# Patient Record
Sex: FEMALE | Race: BLACK OR AFRICAN AMERICAN | NOT HISPANIC OR LATINO | Employment: FULL TIME | URBAN - METROPOLITAN AREA
[De-identification: names, ages, dates, MRNs, and addresses within clinical notes are randomized per-mention and may not be internally consistent; named-entity substitution may affect disease eponyms.]

---

## 2022-08-03 ENCOUNTER — HOSPITAL ENCOUNTER (EMERGENCY)
Facility: HOSPITAL | Age: 52
Discharge: HOME | End: 2022-08-03
Attending: EMERGENCY MEDICINE

## 2022-08-03 VITALS
SYSTOLIC BLOOD PRESSURE: 195 MMHG | RESPIRATION RATE: 16 BRPM | HEIGHT: 63 IN | DIASTOLIC BLOOD PRESSURE: 88 MMHG | TEMPERATURE: 98 F | OXYGEN SATURATION: 98 % | WEIGHT: 160 LBS | HEART RATE: 58 BPM | BODY MASS INDEX: 28.35 KG/M2

## 2022-08-03 DIAGNOSIS — R10.11 RUQ PAIN: ICD-10-CM

## 2022-08-03 DIAGNOSIS — K80.50 BILIARY COLIC: Primary | ICD-10-CM

## 2022-08-03 DIAGNOSIS — R07.81 RIB PAIN ON RIGHT SIDE: ICD-10-CM

## 2022-08-03 DIAGNOSIS — R10.13 EPIGASTRIC ABDOMINAL PAIN: ICD-10-CM

## 2022-08-03 DIAGNOSIS — K80.20 CALCULUS OF GALLBLADDER WITHOUT CHOLECYSTITIS WITHOUT OBSTRUCTION: ICD-10-CM

## 2022-08-03 PROBLEM — M47.9 DEGENERATION OF SPINE: Status: ACTIVE | Noted: 2022-08-03

## 2022-08-03 PROBLEM — R10.10 PAIN OF UPPER ABDOMEN: Status: ACTIVE | Noted: 2022-08-03

## 2022-08-03 LAB
ALBUMIN SERPL BCP-MCNC: 4.4 G/DL (ref 3.5–5.2)
ALP SERPL-CCNC: 64 U/L (ref 55–135)
ALT SERPL W/O P-5'-P-CCNC: 11 U/L (ref 10–44)
ANION GAP SERPL CALC-SCNC: 12 MMOL/L (ref 8–16)
AST SERPL-CCNC: 16 U/L (ref 10–40)
BASOPHILS # BLD AUTO: 0.02 K/UL (ref 0–0.2)
BASOPHILS NFR BLD: 0.2 % (ref 0–1.9)
BILIRUB SERPL-MCNC: 0.6 MG/DL (ref 0.1–1)
BILIRUB UR QL STRIP: NEGATIVE
BUN SERPL-MCNC: 7 MG/DL (ref 6–20)
CALCIUM SERPL-MCNC: 10.1 MG/DL (ref 8.7–10.5)
CHLORIDE SERPL-SCNC: 104 MMOL/L (ref 95–110)
CLARITY UR REFRACT.AUTO: CLEAR
CO2 SERPL-SCNC: 22 MMOL/L (ref 23–29)
COLOR UR AUTO: YELLOW
CREAT SERPL-MCNC: 0.7 MG/DL (ref 0.5–1.4)
DIFFERENTIAL METHOD: ABNORMAL
EOSINOPHIL # BLD AUTO: 0 K/UL (ref 0–0.5)
EOSINOPHIL NFR BLD: 0.1 % (ref 0–8)
ERYTHROCYTE [DISTWIDTH] IN BLOOD BY AUTOMATED COUNT: 14.9 % (ref 11.5–14.5)
EST. GFR  (NO RACE VARIABLE): >60 ML/MIN/1.73 M^2
GLUCOSE SERPL-MCNC: 84 MG/DL (ref 70–110)
GLUCOSE UR QL STRIP: NEGATIVE
HCT VFR BLD AUTO: 37.4 % (ref 37–48.5)
HGB BLD-MCNC: 13.2 G/DL (ref 12–16)
HGB UR QL STRIP: ABNORMAL
IMM GRANULOCYTES # BLD AUTO: 0.02 K/UL (ref 0–0.04)
IMM GRANULOCYTES NFR BLD AUTO: 0.2 % (ref 0–0.5)
KETONES UR QL STRIP: ABNORMAL
LEUKOCYTE ESTERASE UR QL STRIP: NEGATIVE
LIPASE SERPL-CCNC: 57 U/L (ref 4–60)
LYMPHOCYTES # BLD AUTO: 1.5 K/UL (ref 1–4.8)
LYMPHOCYTES NFR BLD: 15.2 % (ref 18–48)
MCH RBC QN AUTO: 33.8 PG (ref 27–31)
MCHC RBC AUTO-ENTMCNC: 35.3 G/DL (ref 32–36)
MCV RBC AUTO: 96 FL (ref 82–98)
MICROSCOPIC COMMENT: NORMAL
MONOCYTES # BLD AUTO: 0.5 K/UL (ref 0.3–1)
MONOCYTES NFR BLD: 5.3 % (ref 4–15)
NEUTROPHILS # BLD AUTO: 7.6 K/UL (ref 1.8–7.7)
NEUTROPHILS NFR BLD: 79 % (ref 38–73)
NITRITE UR QL STRIP: NEGATIVE
NRBC BLD-RTO: 0 /100 WBC
PH UR STRIP: 5 [PH] (ref 5–8)
PLATELET # BLD AUTO: 273 K/UL (ref 150–450)
PMV BLD AUTO: 9.9 FL (ref 9.2–12.9)
POTASSIUM SERPL-SCNC: 3.6 MMOL/L (ref 3.5–5.1)
PROT SERPL-MCNC: 8 G/DL (ref 6–8.4)
PROT UR QL STRIP: NEGATIVE
RBC # BLD AUTO: 3.91 M/UL (ref 4–5.4)
RBC #/AREA URNS AUTO: 2 /HPF (ref 0–4)
SARS-COV-2 RDRP RESP QL NAA+PROBE: NEGATIVE
SODIUM SERPL-SCNC: 138 MMOL/L (ref 136–145)
SP GR UR STRIP: 1.01 (ref 1–1.03)
SQUAMOUS #/AREA URNS AUTO: 1 /HPF
TROPONIN I SERPL DL<=0.01 NG/ML-MCNC: 0.01 NG/ML (ref 0–0.03)
URN SPEC COLLECT METH UR: ABNORMAL
WBC # BLD AUTO: 9.65 K/UL (ref 3.9–12.7)
WBC #/AREA URNS AUTO: 1 /HPF (ref 0–5)

## 2022-08-03 PROCEDURE — 99285 EMERGENCY DEPT VISIT HI MDM: CPT | Mod: CS,,, | Performed by: PHYSICIAN ASSISTANT

## 2022-08-03 PROCEDURE — 96375 TX/PRO/DX INJ NEW DRUG ADDON: CPT

## 2022-08-03 PROCEDURE — 81001 URINALYSIS AUTO W/SCOPE: CPT | Performed by: PHYSICIAN ASSISTANT

## 2022-08-03 PROCEDURE — 93010 ELECTROCARDIOGRAM REPORT: CPT | Mod: ,,, | Performed by: INTERNAL MEDICINE

## 2022-08-03 PROCEDURE — U0002 COVID-19 LAB TEST NON-CDC: HCPCS | Performed by: PHYSICIAN ASSISTANT

## 2022-08-03 PROCEDURE — 63600175 PHARM REV CODE 636 W HCPCS: Performed by: PHYSICIAN ASSISTANT

## 2022-08-03 PROCEDURE — 96374 THER/PROPH/DIAG INJ IV PUSH: CPT

## 2022-08-03 PROCEDURE — 80053 COMPREHEN METABOLIC PANEL: CPT | Performed by: PHYSICIAN ASSISTANT

## 2022-08-03 PROCEDURE — 25000003 PHARM REV CODE 250: Performed by: PHYSICIAN ASSISTANT

## 2022-08-03 PROCEDURE — 99285 EMERGENCY DEPT VISIT HI MDM: CPT | Mod: 25

## 2022-08-03 PROCEDURE — 84484 ASSAY OF TROPONIN QUANT: CPT | Performed by: PHYSICIAN ASSISTANT

## 2022-08-03 PROCEDURE — 87389 HIV-1 AG W/HIV-1&-2 AB AG IA: CPT | Performed by: PHYSICIAN ASSISTANT

## 2022-08-03 PROCEDURE — 99285 PR EMERGENCY DEPT VISIT,LEVEL V: ICD-10-PCS | Mod: CS,,, | Performed by: PHYSICIAN ASSISTANT

## 2022-08-03 PROCEDURE — 85025 COMPLETE CBC W/AUTO DIFF WBC: CPT | Performed by: PHYSICIAN ASSISTANT

## 2022-08-03 PROCEDURE — 96361 HYDRATE IV INFUSION ADD-ON: CPT

## 2022-08-03 PROCEDURE — 86803 HEPATITIS C AB TEST: CPT | Performed by: PHYSICIAN ASSISTANT

## 2022-08-03 PROCEDURE — 83690 ASSAY OF LIPASE: CPT | Performed by: PHYSICIAN ASSISTANT

## 2022-08-03 PROCEDURE — 93010 EKG 12-LEAD: ICD-10-PCS | Mod: ,,, | Performed by: INTERNAL MEDICINE

## 2022-08-03 PROCEDURE — 93005 ELECTROCARDIOGRAM TRACING: CPT

## 2022-08-03 RX ORDER — HYDROMORPHONE HYDROCHLORIDE 1 MG/ML
0.5 INJECTION, SOLUTION INTRAMUSCULAR; INTRAVENOUS; SUBCUTANEOUS
Status: COMPLETED | OUTPATIENT
Start: 2022-08-03 | End: 2022-08-03

## 2022-08-03 RX ORDER — MORPHINE SULFATE 2 MG/ML
6 INJECTION, SOLUTION INTRAMUSCULAR; INTRAVENOUS
Status: DISCONTINUED | OUTPATIENT
Start: 2022-08-03 | End: 2022-08-03

## 2022-08-03 RX ORDER — IBUPROFEN 800 MG/1
800 TABLET ORAL EVERY 6 HOURS PRN
Qty: 20 TABLET | Refills: 0 | Status: SHIPPED | OUTPATIENT
Start: 2022-08-03

## 2022-08-03 RX ORDER — ACETAMINOPHEN 325 MG/1
650 TABLET ORAL
Status: COMPLETED | OUTPATIENT
Start: 2022-08-03 | End: 2022-08-03

## 2022-08-03 RX ORDER — ONDANSETRON 2 MG/ML
4 INJECTION INTRAMUSCULAR; INTRAVENOUS
Status: COMPLETED | OUTPATIENT
Start: 2022-08-03 | End: 2022-08-03

## 2022-08-03 RX ORDER — MORPHINE SULFATE 4 MG/ML
4 INJECTION, SOLUTION INTRAMUSCULAR; INTRAVENOUS
Status: COMPLETED | OUTPATIENT
Start: 2022-08-03 | End: 2022-08-03

## 2022-08-03 RX ORDER — HYDROCODONE BITARTRATE AND ACETAMINOPHEN 5; 325 MG/1; MG/1
1 TABLET ORAL EVERY 6 HOURS PRN
Qty: 12 TABLET | Refills: 0 | Status: SHIPPED | OUTPATIENT
Start: 2022-08-03

## 2022-08-03 RX ORDER — IBUPROFEN 400 MG/1
800 TABLET ORAL EVERY 4 HOURS
COMMUNITY
End: 2022-08-03

## 2022-08-03 RX ADMIN — ONDANSETRON 4 MG: 2 INJECTION INTRAMUSCULAR; INTRAVENOUS at 05:08

## 2022-08-03 RX ADMIN — HYDROMORPHONE HYDROCHLORIDE 0.5 MG: 1 INJECTION, SOLUTION INTRAMUSCULAR; INTRAVENOUS; SUBCUTANEOUS at 07:08

## 2022-08-03 RX ADMIN — MORPHINE SULFATE 4 MG: 4 INJECTION INTRAVENOUS at 05:08

## 2022-08-03 RX ADMIN — SODIUM CHLORIDE 1000 ML: 0.9 INJECTION, SOLUTION INTRAVENOUS at 05:08

## 2022-08-03 RX ADMIN — ACETAMINOPHEN 650 MG: 325 TABLET ORAL at 05:08

## 2022-08-03 NOTE — ED TRIAGE NOTES
To the ED with c/o left thigh pain tht started yesterday, today has moved into left buttock and now wraps around to the right rib cage.  Also expressing its more difficult to breath.

## 2022-08-03 NOTE — PROVIDER PROGRESS NOTES - EMERGENCY DEPT.
Encounter Date: 8/3/2022    ED Physician Progress Notes         EKG - STEMI Decision  Initial Reading: No STEMI present.  Response: No Action Needed.

## 2022-08-03 NOTE — ED PROVIDER NOTES
"Encounter Date: 8/3/2022       History     Chief Complaint   Patient presents with    Abdominal Pain     Reports back pain radiating into upper abdomen. Denies nausea or vomiting     4:17 PM  Patient is a 52 year female with a history breast reduction, right knee pain, presents to [facility] ED with multiple complaints.      She states yesterday evening she noted left thigh pain that resolved.  She started to have low back pain.  This morning around 02:00 she felt "gas" pain under her right lower ribcage.  Feels as if something is stuck.  States it has been constant.  She has not had nausea, vomiting.  Has chronic urinary frequency, but attributes this to using ibuprofen.  No dysuria or hematuria.  Was constipated this morning, but no diarrhea.  Has had some chills, but has not noted fever.  She has had a dry cough for 4 years and attributes this to her tobacco use.  She works as a medical transport, and is unsure of sick contacts.  She denies any heavy lifting, injury, or trauma.  Denies any history of abdominal surgeries.    Denies family history of anything.  Denies history of HTN.  She took ibuprofen 800 mg bid today for chronic R knee pain; states that she takes NSAIDs about 4 times a month.        Review of patient's allergies indicates:  No Known Allergies  History reviewed. No pertinent past medical history.  Past Surgical History:   Procedure Laterality Date    BREAST SURGERY       History reviewed. No pertinent family history.  Social History     Tobacco Use    Smoking status: Current Some Day Smoker     Types: Cigarettes   Substance Use Topics    Alcohol use: Yes    Drug use: No     Review of Systems   Constitutional: Positive for chills. Negative for diaphoresis and fever.   HENT: Negative for sore throat.    Respiratory: Positive for cough (4 years, dry). Negative for shortness of breath.    Cardiovascular: Positive for chest pain.   Gastrointestinal: Positive for constipation. Negative for abdominal " pain, diarrhea, nausea and vomiting.   Genitourinary: Positive for frequency (chronic). Negative for dysuria, hematuria and pelvic pain.   Musculoskeletal: Positive for back pain and myalgias.   Skin: Negative for rash.   Neurological: Negative for weakness and headaches.   Hematological: Does not bruise/bleed easily.   Psychiatric/Behavioral: Positive for sleep disturbance.       Physical Exam     Initial Vitals [08/03/22 1505]   BP Pulse Resp Temp SpO2   (!) 203/102 64 15 100 °F (37.8 °C) 98 %      MAP       --         Physical Exam    Vitals reviewed.  Constitutional: She appears well-developed and well-nourished. She is not diaphoretic. She is cooperative.  Non-toxic appearance. She does not have a sickly appearance. She does not appear ill. No distress. Face mask in place.   HENT:   Head: Normocephalic and atraumatic.   Nose: Nose normal.   Mouth/Throat: No trismus in the jaw.   Eyes: Conjunctivae and EOM are normal.   Neck:   Normal range of motion.  Cardiovascular: Normal rate and regular rhythm.   Pulses:       Radial pulses are 2+ on the right side and 2+ on the left side.        Dorsalis pedis pulses are 2+ on the right side and 2+ on the left side.   Intact distal pulses and symmetric.   Pulmonary/Chest: No accessory muscle usage. No tachypnea. No respiratory distress.   Abdominal: She exhibits no distension. There is abdominal tenderness. There is guarding and positive Salguero's sign.   Musculoskeletal:         General: Normal range of motion.      Cervical back: Normal range of motion.     Neurological: She is alert. She has normal strength.   Skin: Skin is warm and dry. No erythema. No pallor.         ED Course   Procedures  Labs Reviewed   CBC W/ AUTO DIFFERENTIAL - Abnormal; Notable for the following components:       Result Value    RBC 3.91 (*)     MCH 33.8 (*)     RDW 14.9 (*)     Gran % 79.0 (*)     Lymph % 15.2 (*)     All other components within normal limits   COMPREHENSIVE METABOLIC PANEL -  Abnormal; Notable for the following components:    CO2 22 (*)     All other components within normal limits   URINALYSIS, REFLEX TO URINE CULTURE - Abnormal; Notable for the following components:    Ketones, UA 3+ (*)     Occult Blood UA 2+ (*)     All other components within normal limits    Narrative:     Specimen Source->Urine   SARS-COV-2 RNA AMPLIFICATION, QUAL   LIPASE   TROPONIN I   URINALYSIS MICROSCOPIC    Narrative:     Specimen Source->Urine   HIV 1 / 2 ANTIBODY   HEPATITIS C ANTIBODY     EKG Readings: (Independently Interpreted)   Initial Reading: No STEMI. Rhythm: Normal Sinus Rhythm. Heart Rate: 60. Clinical Impression: Normal Sinus Rhythm     ECG Results          EKG 12-lead (Final result)  Result time 08/03/22 22:59:12    Final result by Interface, Lab In [facility] (08/03/22 22:59:12)                 Narrative:    Test Reason : R10.13,    Vent. Rate : 060 BPM     Atrial Rate : 060 BPM     P-R Int : 176 ms          QRS Dur : 076 ms      QT Int : 390 ms       P-R-T Axes : 053 031 019 degrees     QTc Int : 390 ms    Normal sinus rhythm  Normal ECG  When compared with ECG of 01-MAR-2014 00:54,  No significant change was found  Confirmed by Jacques Toussaint MD ([id number]) on 8/3/2022 10:59:07 PM    Referred By: [email or web address]   SELF           Confirmed By:Jacques Toussaint MD                            Imaging Results          US Abdomen Limited (Gallbladder) (Final result)  Result time 08/03/22 18:43:42    Final result by Phil Alicea MD (08/03/22 18:43:42)                 Impression:      Cholelithiasis without findings to suggest acute cholecystitis noting mild intrahepatic biliary dilation.  The common duct is not dilated, no discrete sonographic filling defects seen within the common duct.  Correlation is advised, further evaluation as warranted.      Electronically signed by: Phil Alicea MD  Date:    08/03/2022  Time:    18:43             Narrative:    EXAMINATION:  US ABDOMEN LIMITED    CLINICAL  HISTORY:  Right upper quadrant pain    TECHNIQUE:  Limited ultrasound of the right upper quadrant of the abdomen (including pancreas, liver, gallbladder, common bile duct, and spleen) was performed.    COMPARISON:  None.    FINDINGS:  The visualized portions of the pancreas are unremarkable.  The gallbladder is nondistended.  There is cholelithiasis.  No gallbladder wall thickening or pericholecystic fluid.  No gallbladder wall hyperemia.  The common duct is not dilated measuring 0.3 cm.  The hepatic parenchyma is homogeneous.  The liver is not enlarged.  There is mild intrahepatic biliary dilation.  The visualized portions of the right kidney are unremarkable.  No ascites.                                 Medications   acetaminophen tablet 650 mg (650 mg Oral Given 8/3/22 1700)   morphine injection 4 mg (4 mg Intravenous Given 8/3/22 1701)   ondansetron injection 4 mg (4 mg Intravenous Given 8/3/22 1701)   sodium chloride 0.9% bolus 1,000 mL (0 mLs Intravenous Stopped 8/3/22 1804)   HYDROmorphone injection 0.5 mg (0.5 mg Intravenous Given 8/3/22 1945)     Medical Decision Making:   History:   Old Medical Records: I decided to obtain old medical records.  Old Records Summarized: records from clinic visits and records from previous admission(s).  Initial Assessment:   Patient is a 52 year female with a history breast reduction, right knee pain, presents to [facility] ED with multiple complaints.   Differential Diagnosis:   Includes but is not limited to cholelithiasis, choledocholithiasis, biliary colic, cholecystitis, given positive salmon sign, GERD, gastritis, ulcer disease, constipation, UTI such as pyelonephritis, COVID-19, other viral syndrome.  Clinical Tests:   Lab Tests: Reviewed and Ordered  Radiological Study: Ordered and Reviewed  Medical Tests: Reviewed and Ordered  ED Management:  On my independent interpretation, EKG with NSR at 60 beats per minute.  T-wave abnormality seen in lead 3. No STEMI.    Will  initiate workup, obtain ultrasound given TTP on exam, give medication for symptomatic relief, and continue monitor.  Other:   I have discussed this case with another health care provider.            Attending Attestation:     Physician Attestation Statement for NP/PA:   I have conducted a face to face encounter with this patient in addition to the NP/PA, due to Medical Complexity    Other NP/PA Attestation Additions:    History of Present Illness: 51 yo female presenting with multiple concerns today, some chronic.  Primary issue is RUQ pain, no vomiting or fevers.     Physical Exam: Abdomen soft, RUQ TTP  No distress, afebrile  Intact and equal distal pulses  Nonfocal neuro exam    Medical Decision Making: Cholelithiasis, no cholecystitis - favor this as etiology of her pain.  Appreciate gen surg evaluation of pt.  No s/sx concerning for cauda quina.  Doubt aortic pathology - intact and equal distal pulses.  Needs close PCP f/u for repeat blood pressure check in 3 days.              ED Course as of 08/04/22 1043   Wed Aug 03, 2022   1603 BP(!): 203/102 [CL]   1604 Temp: 100 °F (37.8 °C) [CL]   1604 Pulse: 64 [CL]   1604 Resp: 15 [CL]   1604 SpO2: 98 % [CL]   1736 SARS-CoV-2 RNA, Amplification, Qual: Negative [CL]   1736 WBC: 9.65 [CL]   1736 Hemoglobin: 13.2 [CL]   1736 Sodium: 138 [CL]   1736 Potassium: 3.6 [CL]   1736 Chloride: 104 [CL]   1736 Glucose: 84 [CL]   1736 Creatinine: 0.7 [CL]   1736 BILIRUBIN TOTAL: 0.6 [CL]   1736 AST: 16 [CL]   1736 ALT: 11 [CL]   1736 Lipase: 57 [CL]   1736 Troponin I: 0.010  No need to trend. R rib pain onset at 0200 today. [CL]   1928 Pain still an 8/10. Down from 10/10. Will discuss case with general surgery for recommendations of intractable biliary colic. Will give 2nd dose of IV pain medication. [CL]   1933 Case discussed with general surgery. [CL]      ED Course User Index  [CL] Cyndi Najera PA-C           General surgery has evaluated patient.  No indication for emergent  surgery at this time.  Her labs are reassuring, no signs of inflammation or obstuction.  Patient has improvement with pain medication.  They will see her in clinic.  Patient updated with plan of care.  She is comfortable with this. Ibuprofen for mild-to-moderate pain.  Norco for severe pain only; discussed proper use in details. We discussed dietary changes including no fat diet. Follow up General surgery.  She was given return to ED precautions, she voices her understanding.  All of her questions were answered.  Patient comfortable plan is stable for discharge.    I have reviewed patient's chart and discussed this case with my supervising MD.     Cyndi Najera PA-C  Emergent Department  [facility]  Spectralink #[id number] or #[id number]    Clinical Impression:   Final diagnoses:  [R10.13] Epigastric abdominal pain  [R07.81] Rib pain on right side  [R10.11] RUQ pain  [K80.50] Biliary colic (Primary)  [K80.20] Calculus of gallbladder without cholecystitis without obstruction          ED Disposition Condition    Discharge Stable        ED Prescriptions     Medication Sig Dispense Start Date End Date Auth. Provider    HYDROcodone-acetaminophen (NORCO) 5-325 mg per tablet Take 1 tablet by mouth every 6 (six) hours as needed for Pain. 12 tablet 8/3/2022  Cyndi Najera PA-C    ibuprofen (ADVIL,MOTRIN) 800 MG tablet Take 1 tablet (800 mg total) by mouth every 6 (six) hours as needed for Pain. 20 tablet 8/3/2022  Cyndi Najera PA-C        Follow-up Information     Follow up With Specialties Details Why Contact Info Additional Information    [facility] Schedule an appointment as soon as possible for a visit   [address]  [facility] [address] [phone] [facility] - Main Building, 2nd Floor Please park in [facility] and use escalator or Clinic elevator    [facility] - Emergency Dept Emergency Medicine  If symptoms worsen [id number]  [place]  [place] [address]  [phone]              Cyndi Najera PA-C  08/04/22 0009       Andriy Martins MD  08/04/22 1043

## 2022-08-03 NOTE — Clinical Note
"Dalila Mullenjarvis Bolivar was seen and treated in our emergency department on 8/3/2022.  She may return to work on 08/05/2022.       If you have any questions or concerns, please don't hesitate to call.      Cyndi Najera PA-C"

## 2022-08-04 LAB
HCV AB SERPL QL IA: NEGATIVE
HIV 1+2 AB+HIV1 P24 AG SERPL QL IA: NEGATIVE

## 2022-08-04 NOTE — HPI
51 yo F with history of chronic joint pains who presents to the ED with numerous complaints, the most recently bothersome being upper abdominal pain. Patient reports pain started this morning in her lower back and is now causing a sharp pain underneath her right ribs and into her mid-epigastric region. Pain has been somewhat relieved with pain medications in ED, but still present and constant. She denies any relation to food or oral intake. No identifiable aggravating or relieving factors. Has not had pain similar to this in the past. Imaging obtained in ED showed cholelithiasis with no evidence of cholecystitis (no wall thickening, pericholecystic fluid, or ductal dilation). Labs were unremarkable - no leukocytosis, normal LFTs. Patient denies fevers or chills, no nausea or vomiting. Reports some constipation more than normal, last bowel movement was yesterday. She tested negative for COVID today. UA with some occult blood otherwise normal. General Surgery was consulted for further evaluation.

## 2022-08-04 NOTE — ASSESSMENT & PLAN NOTE
51 yo F with history of chronic joint pain who presents to ED with complaints of 1 day history of upper abdominal pain. Work up showing cholelithiasis without evidence of cholecystitis or choledocholithiasis. Pain has somewhat improved since presentation. Likely biliary colic/symptomatic cholelithiasis with possible passage of small stone.     - No acute surgical intervention indicated at this time  - Counseled patient on return precautions, she expressed understanding.  - She should be referred to General Surgery clinic to undergo evaluation for elective cholecystectomy    Please call with questions or concerns.

## 2022-08-04 NOTE — CONSULTS
[facility] - Emergency Dept  General Surgery  Consult Note    Patient Name: Dalila Bolivar  MRN: [id number]  Code Status: No Order  Admission Date: 8/3/2022  Hospital Length of Stay: 0 days  Attending Physician: Andriy Martins MD  Primary Care Provider: Primary Doctor No    Patient information was obtained from patient, past medical records and ER records.     Inpatient consult to General surgery  Consult performed by: Leona Chambers MD  Consult ordered by: Cyndi Najera PA-C  Reason for consult: Abdominal pain        Subjective:     Principal Problem: Pain of upper abdomen    History of Present Illness: 53 yo F with history of chronic joint pains who presents to the ED with numerous complaints, the most recently bothersome being upper abdominal pain. Patient reports pain started this morning in her lower back and is now causing a sharp pain underneath her right ribs and into her mid-epigastric region. Pain has been somewhat relieved with pain medications in ED, but still present and constant. She denies any relation to food or oral intake. No identifiable aggravating or relieving factors. Has not had pain similar to this in the past. Imaging obtained in ED showed cholelithiasis with no evidence of cholecystitis (no wall thickening, pericholecystic fluid, or ductal dilation). Labs were unremarkable - no leukocytosis, normal LFTs. Patient denies fevers or chills, no nausea or vomiting. Reports some constipation more than normal, last bowel movement was yesterday. She tested negative for COVID today. UA with some occult blood otherwise normal. General Surgery was consulted for further evaluation.         No current facility-administered medications on file prior to encounter.     Current Outpatient Medications on File Prior to Encounter   Medication Sig    ibuprofen (ADVIL,MOTRIN) 400 MG tablet Take 800 mg by mouth every 4 (four) hours.       Review of patient's allergies indicates:  No Known  Allergies    History reviewed. No pertinent past medical history.  Past Surgical History:   Procedure Laterality Date    BREAST SURGERY       Family History    None       Tobacco Use    Smoking status: Current Some Day Smoker     Types: Cigarettes    Smokeless tobacco: Not on file   Substance and Sexual Activity    Alcohol use: Yes    Drug use: No    Sexual activity: Not on file     Review of Systems   Constitutional:  Negative for chills and fever.   HENT:  Negative for trouble swallowing and voice change.    Respiratory:  Positive for cough (chronic). Negative for shortness of breath.    Cardiovascular:  Negative for chest pain and palpitations.   Gastrointestinal:  Positive for abdominal pain and constipation. Negative for diarrhea, nausea and vomiting.   Genitourinary:  Negative for difficulty urinating and dysuria.   Musculoskeletal:  Positive for back pain.   Skin:  Negative for rash and wound.   Neurological:  Negative for dizziness and weakness.   All other systems reviewed and are negative.  Objective:     Vital Signs (Most Recent):  Temp: 98.3 °F (36.8 °C) (08/03/22 1658)  Pulse: (!) 58 (08/03/22 1928)  Resp: 16 (08/03/22 1935)  BP: (!) 210/92 (08/03/22 1928)  SpO2: 98 % (08/03/22 1505)   Vital Signs (24h Range):  Temp:  [98.3 °F (36.8 °C)-100 °F (37.8 °C)] 98.3 °F (36.8 °C)  Pulse:  [58-64] 58  Resp:  [15-20] 16  SpO2:  [98 %] 98 %  BP: (202-210)/() 210/92     Weight: 72.6 kg (160 lb)  Body mass index is 28.34 kg/m².    Physical Exam  Vitals reviewed.   Constitutional:       General: She is not in acute distress.     Appearance: She is not toxic-appearing.   HENT:      Head: Normocephalic and atraumatic.   Eyes:      Extraocular Movements: Extraocular movements intact.      Conjunctiva/sclera: Conjunctivae normal.   Cardiovascular:      Rate and Rhythm: Normal rate.      Pulses: Normal pulses.   Pulmonary:      Effort: Pulmonary effort is normal. No respiratory distress.   Abdominal:       General: Abdomen is flat. There is no distension.      Palpations: Abdomen is soft.      Tenderness: There is abdominal tenderness (some tenderness to deep palpation across upper abdomen, negative Salguero's sign). There is no guarding or rebound.   Musculoskeletal:         General: No swelling. Normal range of motion.      Cervical back: Normal range of motion and neck supple.   Skin:     General: Skin is warm and dry.   Neurological:      General: No focal deficit present.      Mental Status: She is alert and oriented to person, place, and time.       Significant Labs:  I have reviewed all pertinent lab results within the past 24 hours.  CBC:   Recent Labs   Lab 08/03/22  1634   WBC 9.65   RBC 3.91*   HGB 13.2   HCT 37.4      MCV 96   MCH 33.8*   MCHC 35.3     BMP:   Recent Labs   Lab 08/03/22  1634   GLU 84      K 3.6      CO2 22*   BUN 7   CREATININE 0.7   CALCIUM 10.1       Significant Diagnostics:  I have reviewed all pertinent imaging results/findings within the past 24 hours.      Assessment/Plan:     * Pain of upper abdomen  53 yo F with history of chronic joint pain who presents to ED with complaints of 1 day history of upper abdominal pain. Work up showing cholelithiasis without evidence of cholecystitis or choledocholithiasis. Pain has somewhat improved since presentation. Likely biliary colic/symptomatic cholelithiasis with possible passage of small stone.     - No acute surgical intervention indicated at this time  - Counseled patient on return precautions, she expressed understanding.  - She should be referred to General Surgery clinic to undergo evaluation for elective cholecystectomy    Please call with questions or concerns.       VTE Risk Mitigation (From admission, onward)    None          Thank you for your consult. I will sign off. Please contact us if you have any additional questions.    Leona Chambers MD  General Surgery  [facility] Emergency Dept

## 2022-08-04 NOTE — SUBJECTIVE & OBJECTIVE
No current facility-administered medications on file prior to encounter.     Current Outpatient Medications on File Prior to Encounter   Medication Sig    ibuprofen (ADVIL,MOTRIN) 400 MG tablet Take 800 mg by mouth every 4 (four) hours.       Review of patient's allergies indicates:  No Known Allergies    History reviewed. No pertinent past medical history.  Past Surgical History:   Procedure Laterality Date    BREAST SURGERY       Family History    None       Tobacco Use    Smoking status: Current Some Day Smoker     Types: Cigarettes    Smokeless tobacco: Not on file   Substance and Sexual Activity    Alcohol use: Yes    Drug use: No    Sexual activity: Not on file     Review of Systems   Constitutional:  Negative for chills and fever.   HENT:  Negative for trouble swallowing and voice change.    Respiratory:  Positive for cough (chronic). Negative for shortness of breath.    Cardiovascular:  Negative for chest pain and palpitations.   Gastrointestinal:  Positive for abdominal pain and constipation. Negative for diarrhea, nausea and vomiting.   Genitourinary:  Negative for difficulty urinating and dysuria.   Musculoskeletal:  Positive for back pain.   Skin:  Negative for rash and wound.   Neurological:  Negative for dizziness and weakness.   All other systems reviewed and are negative.  Objective:     Vital Signs (Most Recent):  Temp: 98.3 °F (36.8 °C) (08/03/22 1658)  Pulse: (!) 58 (08/03/22 1928)  Resp: 16 (08/03/22 1935)  BP: (!) 210/92 (08/03/22 1928)  SpO2: 98 % (08/03/22 1505)   Vital Signs (24h Range):  Temp:  [98.3 °F (36.8 °C)-100 °F (37.8 °C)] 98.3 °F (36.8 °C)  Pulse:  [58-64] 58  Resp:  [15-20] 16  SpO2:  [98 %] 98 %  BP: (202-210)/() 210/92     Weight: 72.6 kg (160 lb)  Body mass index is 28.34 kg/m².    Physical Exam  Vitals reviewed.   Constitutional:       General: She is not in acute distress.     Appearance: She is not toxic-appearing.   HENT:      Head: Normocephalic and atraumatic.    Eyes:      Extraocular Movements: Extraocular movements intact.      Conjunctiva/sclera: Conjunctivae normal.   Cardiovascular:      Rate and Rhythm: Normal rate.      Pulses: Normal pulses.   Pulmonary:      Effort: Pulmonary effort is normal. No respiratory distress.   Abdominal:      General: Abdomen is flat. There is no distension.      Palpations: Abdomen is soft.      Tenderness: There is abdominal tenderness (some tenderness to deep palpation across upper abdomen, negative Salguero's sign). There is no guarding or rebound.   Musculoskeletal:         General: No swelling. Normal range of motion.      Cervical back: Normal range of motion and neck supple.   Skin:     General: Skin is warm and dry.   Neurological:      General: No focal deficit present.      Mental Status: She is alert and oriented to person, place, and time.       Significant Labs:  I have reviewed all pertinent lab results within the past 24 hours.  CBC:   Recent Labs   Lab 08/03/22  1634   WBC 9.65   RBC 3.91*   HGB 13.2   HCT 37.4      MCV 96   MCH 33.8*   MCHC 35.3     BMP:   Recent Labs   Lab 08/03/22  1634   GLU 84      K 3.6      CO2 22*   BUN 7   CREATININE 0.7   CALCIUM 10.1       Significant Diagnostics:  I have reviewed all pertinent imaging results/findings within the past 24 hours.

## 2022-08-04 NOTE — DISCHARGE INSTRUCTIONS
Take ibuprofen 600-800 mg every 6 hours as needed with foods for anti-inflammatory relief.  You can take acetaminophen/tylenol 650 mg every 6 hours or 1000 mg every 8 hours for added relief.  Apply ice to the area for 10-20 minutes every 4 hours. You can apply heat 2 days after for the same duration and frequency.  Follow up with General Surgery if your symptoms do not improve.   Return to the ER for new or worsening symptoms.